# Patient Record
(demographics unavailable — no encounter records)

---

## 2024-12-30 NOTE — ASSESSMENT
[FreeTextEntry1] : The patient was advised of the diagnosis. The natural history of the pathology was explained in full to the patient in layman's terms. All questions were answered. The risks and benefits of surgical and non-surgical treatment alternatives were explained in full to the patient.  Pt provided right shoulder lateral subacromial CSI #1 today  Provided Mobic 15 mg/d x 10 days.  PT rx provided.  RTO in 4 weeks for f/u care.

## 2024-12-30 NOTE — IMAGING
[de-identified] : Right hand: Skin intact NVID FAROM 3 mm mass over dorsum of right index finger P1.   Right shoulder: Skin intact NVID FAROM +empty can +impingement signs pain with abduction speed and yergason signs are negative ROM is full  posterior lift off is negative  12/30/2024 right shoulder xray showed type 3 acromion with no acute bony pathology  12/30/2024: right index finger 3 view xray showed no acute bony pathology.